# Patient Record
Sex: FEMALE
[De-identification: names, ages, dates, MRNs, and addresses within clinical notes are randomized per-mention and may not be internally consistent; named-entity substitution may affect disease eponyms.]

---

## 2021-07-14 PROBLEM — Z00.00 ENCOUNTER FOR PREVENTIVE HEALTH EXAMINATION: Status: ACTIVE | Noted: 2021-07-14

## 2021-07-15 ENCOUNTER — APPOINTMENT (OUTPATIENT)
Dept: FAMILY MEDICINE | Facility: CLINIC | Age: 21
End: 2021-07-15
Payer: COMMERCIAL

## 2021-07-15 VITALS
HEIGHT: 64.5 IN | WEIGHT: 125 LBS | SYSTOLIC BLOOD PRESSURE: 116 MMHG | DIASTOLIC BLOOD PRESSURE: 68 MMHG | HEART RATE: 88 BPM | OXYGEN SATURATION: 99 % | TEMPERATURE: 97.3 F | BODY MASS INDEX: 21.08 KG/M2

## 2021-07-15 DIAGNOSIS — L30.9 DERMATITIS, UNSPECIFIED: ICD-10-CM

## 2021-07-15 DIAGNOSIS — Z11.1 ENCOUNTER FOR SCREENING FOR RESPIRATORY TUBERCULOSIS: ICD-10-CM

## 2021-07-15 DIAGNOSIS — F98.8 OTHER SPECIFIED BEHAVIORAL AND EMOTIONAL DISORDERS WITH ONSET USUALLY OCCURRING IN CHILDHOOD AND ADOLESCENCE: ICD-10-CM

## 2021-07-15 DIAGNOSIS — K58.9 IRRITABLE BOWEL SYNDROME W/OUT DIARRHEA: ICD-10-CM

## 2021-07-15 DIAGNOSIS — K21.9 GASTRO-ESOPHAGEAL REFLUX DISEASE W/OUT ESOPHAGITIS: ICD-10-CM

## 2021-07-15 PROCEDURE — 99203 OFFICE O/P NEW LOW 30 MIN: CPT | Mod: 25

## 2021-07-15 PROCEDURE — 99072 ADDL SUPL MATRL&STAF TM PHE: CPT

## 2021-07-15 PROCEDURE — 36415 COLL VENOUS BLD VENIPUNCTURE: CPT

## 2021-07-15 RX ORDER — FAMOTIDINE 20 MG/1
20 TABLET, FILM COATED ORAL
Qty: 60 | Refills: 0 | Status: ACTIVE | COMMUNITY
Start: 2021-07-15

## 2021-07-15 RX ORDER — DEXTROAMPHETAMINE SACCHARATE AND AMPHETAMINE ASPARTATE AND DEXTROAMPHETAMINE SULFATE AND AMPHETAMINE SULFATE 5; 5; 5; 5 MG/1; MG/1; MG/1; MG/1
20 TABLET ORAL
Refills: 0 | Status: ACTIVE | COMMUNITY

## 2021-07-15 NOTE — HEALTH RISK ASSESSMENT
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

## 2021-07-15 NOTE — HISTORY OF PRESENT ILLNESS
[Other: _____] : [unfilled] [FreeTextEntry1] : Here to establish care, needs tb screening for volunteer position.  [de-identified] : Here to establish care, needs tb screening for volunteer position. \par PreMed at [facility]-planning to volunteer in the [place] [facility].\par Needs TB screening.\par Notes she had a physical within the year; had to have cholesterol repeated and it was fine. \par Also with history of eczema. \par Follows with [facility]rologist[facility] in [place] for ADD 20mg ER. \par Takes Pepcid-for heartburn twice a day.  Hx of IBS; has had endoscopy.\par \par No allergies. \par \par Never had positive PPD. \par Stopped OCP ~5 months ago and period has been regular. \par \par \par

## 2021-07-15 NOTE — HISTORY OF PRESENT ILLNESS
[Other: _____] : [unfilled] [FreeTextEntry1] : Here to establish care, needs tb screening for volunteer position.  [de-identified] : Here to establish care, needs tb screening for volunteer position. \par PreMed at [facility]-planning to volunteer in the [place] [facility].\par Needs TB screening.\par Notes she had a physical within the year; had to have cholesterol repeated and it was fine. \par Also with history of eczema. \par Follows with [facility]rologist[facility] in [place] for ADD 20mg ER. \par Takes Pepcid-for heartburn twice a day.  Hx of IBS; has had endoscopy.\par \par No allergies. \par \par Never had positive PPD. \par Stopped OCP ~5 months ago and period has been regular. \par \par \par

## 2021-07-15 NOTE — HEALTH RISK ASSESSMENT
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

## 2021-07-15 NOTE — PLAN
[FreeTextEntry1] : Will follow up labwork drawn in office today.\par \par Advised patient needs to get us records of her previous vaccines/titers. \par Advised to call back with topical steroid ointment she is using for Eczema. \par \par Patient expressed understanding of plan.

## 2021-07-15 NOTE — REVIEW OF SYSTEMS
[Heartburn] : heartburn [Negative] : Genitourinary [Headache] : no headache [Dizziness] : no dizziness [FreeTextEntry7] : IBS-has seen 3 different gastro

## 2021-07-19 LAB
M TB IFN-G BLD-IMP: NEGATIVE
QUANTIFERON TB PLUS MITOGEN MINUS NIL: 3.16 IU/ML
QUANTIFERON TB PLUS NIL: 0.04 IU/ML
QUANTIFERON TB PLUS TB1 MINUS NIL: -0.01 IU/ML
QUANTIFERON TB PLUS TB2 MINUS NIL: 0 IU/ML